# Patient Record
Sex: FEMALE | Race: BLACK OR AFRICAN AMERICAN | HISPANIC OR LATINO | URBAN - METROPOLITAN AREA
[De-identification: names, ages, dates, MRNs, and addresses within clinical notes are randomized per-mention and may not be internally consistent; named-entity substitution may affect disease eponyms.]

---

## 2024-08-24 ENCOUNTER — OFFICE VISIT (OUTPATIENT)
Dept: URGENT CARE | Facility: CLINIC | Age: 54
End: 2024-08-24
Payer: COMMERCIAL

## 2024-08-24 VITALS
RESPIRATION RATE: 19 BRPM | DIASTOLIC BLOOD PRESSURE: 79 MMHG | HEIGHT: 64 IN | BODY MASS INDEX: 28.51 KG/M2 | OXYGEN SATURATION: 96 % | WEIGHT: 167 LBS | SYSTOLIC BLOOD PRESSURE: 123 MMHG | TEMPERATURE: 98 F | HEART RATE: 78 BPM

## 2024-08-24 DIAGNOSIS — M54.31 SCIATICA OF RIGHT SIDE: Primary | ICD-10-CM

## 2024-08-24 PROCEDURE — 99214 OFFICE O/P EST MOD 30 MIN: CPT | Performed by: PHYSICIAN ASSISTANT

## 2024-08-24 RX ORDER — METHIMAZOLE 10 MG/1
10 TABLET ORAL 3 TIMES DAILY
COMMUNITY

## 2024-08-24 RX ORDER — METHOCARBAMOL 500 MG/1
500 TABLET, FILM COATED ORAL 3 TIMES DAILY
Qty: 9 TABLET | Refills: 0 | Status: SHIPPED | OUTPATIENT
Start: 2024-08-24

## 2024-08-24 RX ORDER — OMEPRAZOLE 40 MG/1
1 CAPSULE, DELAYED RELEASE ORAL DAILY
COMMUNITY
Start: 2024-05-17

## 2024-08-24 RX ORDER — SEMAGLUTIDE 1 MG/.5ML
INJECTION, SOLUTION SUBCUTANEOUS
COMMUNITY
Start: 2024-07-16

## 2024-08-24 NOTE — PROGRESS NOTES
[facility]        NAME: Patrizia Abraham is a 54 y.o. female  : 1970    MRN: [id number]  DATE: 2024  TIME: 12:32 PM    Assessment and Plan   Sciatica of right side [M54.31]  1. Sciatica of right side  methocarbamol (ROBAXIN) 500 mg tablet            Patient Instructions   Advised her to continue ibuprofen and Tylenol as well as increase her omeprazole to 40 mg twice daily to help with stomach protection.  She was advised to stop the medication as soon as she receives relief of her sciatica.  We also prescribed Robaxin which patient is to take as prescribed.  We discussed not driving or operating machinery while taking this medication.  We discussed using heating pad 20 minutes on 20 minutes off.  Should patient not have relief of her symptoms by Monday or Tuesday patient should follow-up with PCP for possible PT referral or further workup.  If tests have been performed at [facility], our office will contact you with results if changes need to be made to the care plan discussed with you at the visit.  You can review your full results on [facility]  Follow up with PCP in 3-5 days.  Proceed to  ER if symptoms worsen.    Chief Complaint     Chief Complaint   Patient presents with    Back Pain     Back pain, down right side. 3 weeks. Tried OTC meds.          History of Present Illness       HPI  This is a 54-year-old female here complaining of right sided back pain with sciatica extending down her right leg for the last 2 to 3 weeks.  Patient has tried taking ibuprofen 800 mg twice daily at most.  She notes she did have gastric bypass in his concern for gastritis.  She denies any injury or trauma to her back.  She does note that she has a history of sciatica.  She denies any numbness or tingling in the leg, loss of bowel or bladder function, fever, vomiting, diarrhea, shortness of breath or chest pain.  Review of Systems   Review of Systems   Constitutional:  Negative for fever.  "  Respiratory:  Negative for shortness of breath.    Cardiovascular:  Negative for chest pain.   Gastrointestinal:  Negative for diarrhea and vomiting.   Musculoskeletal:  Positive for back pain.   Neurological:  Negative for numbness.         Current Medications       Current Outpatient Medications:     methimazole (TAPAZOLE) 10 mg tablet, Take 10 mg by mouth 3 (three) times a day, Disp: , Rfl:     methocarbamol (ROBAXIN) 500 mg tablet, Take 1 tablet (500 mg total) by mouth 3 (three) times a day, Disp: 9 tablet, Rfl: 0    omeprazole (PriLOSEC) 40 MG capsule, Take 1 capsule by mouth daily, Disp: , Rfl:     Wegovy 1 MG/0.5ML, INJECT 1 MG EVERY WEEK BY SUBCUTANEOUS ROUTE., Disp: , Rfl:     Current Allergies     Allergies as of 08/24/2024    (No Known Allergies)            The following portions of the patient's history were reviewed and updated as appropriate: allergies, current medications, past family history, past medical history, past social history, past surgical history and problem list.     No past medical history on file.    Past Surgical History:   Procedure Laterality Date    GASTRIC BYPASS      HERNIA REPAIR         No family history on file.      Medications have been verified.        Objective   /79   Pulse 78   Temp 98 °F (36.7 °C)   Resp 19   Ht 5' 4\" (1.626 m)   Wt 75.8 kg (167 lb)   SpO2 96%   BMI 28.67 kg/m²        Physical Exam     Physical Exam  Vitals and nursing note reviewed.   Constitutional:       General: She is not in acute distress.     Appearance: Normal appearance. She is not ill-appearing, toxic-appearing or diaphoretic.   Cardiovascular:      Rate and Rhythm: Normal rate and regular rhythm.   Pulmonary:      Effort: Pulmonary effort is normal.      Breath sounds: Normal breath sounds.   Musculoskeletal:      Lumbar back: Tenderness present. No swelling, edema, signs of trauma or bony tenderness. Normal range of motion. Negative right straight leg raise test and negative " left straight leg raise test.      Comments: Patient has pain to palpation of the left lumbar paraspinal muscles.  She has full range of motion of the thoracic and lumbar spine.  Patient has pain with range of motion with right lateral bending.  Bilateral lower extremity strength 5 out of 5.  Is neurovascularly intact.   Neurological:      Mental Status: She is alert.

## 2024-09-07 ENCOUNTER — APPOINTMENT (EMERGENCY)
Dept: CT IMAGING | Facility: HOSPITAL | Age: 54
End: 2024-09-07
Payer: COMMERCIAL

## 2024-09-07 ENCOUNTER — HOSPITAL ENCOUNTER (EMERGENCY)
Facility: HOSPITAL | Age: 54
Discharge: HOME | End: 2024-09-07
Attending: EMERGENCY MEDICINE
Payer: COMMERCIAL

## 2024-09-07 VITALS
RESPIRATION RATE: 18 BRPM | DIASTOLIC BLOOD PRESSURE: 73 MMHG | SYSTOLIC BLOOD PRESSURE: 152 MMHG | OXYGEN SATURATION: 100 % | WEIGHT: 176.81 LBS | HEART RATE: 77 BPM | TEMPERATURE: 98.2 F | BODY MASS INDEX: 30.35 KG/M2

## 2024-09-07 DIAGNOSIS — M54.50 LOW BACK PAIN: Primary | ICD-10-CM

## 2024-09-07 LAB
ALBUMIN SERPL BCG-MCNC: 4 G/DL (ref 3.5–5)
ALP SERPL-CCNC: 48 U/L (ref 34–104)
ALT SERPL W P-5'-P-CCNC: 10 U/L (ref 7–52)
ANION GAP SERPL CALCULATED.3IONS-SCNC: 4 MMOL/L (ref 4–13)
AST SERPL W P-5'-P-CCNC: 13 U/L (ref 13–39)
BASOPHILS # BLD AUTO: 0.06 THOUSANDS/ÂΜL (ref 0–0.1)
BASOPHILS NFR BLD AUTO: 1 % (ref 0–1)
BILIRUB SERPL-MCNC: 0.36 MG/DL (ref 0.2–1)
BILIRUB UR QL STRIP: NEGATIVE
BUN SERPL-MCNC: 14 MG/DL (ref 5–25)
CALCIUM SERPL-MCNC: 9.1 MG/DL (ref 8.4–10.2)
CHLORIDE SERPL-SCNC: 107 MMOL/L (ref 96–108)
CLARITY UR: CLEAR
CO2 SERPL-SCNC: 31 MMOL/L (ref 21–32)
COLOR UR: COLORLESS
CREAT SERPL-MCNC: 0.65 MG/DL (ref 0.6–1.3)
EOSINOPHIL # BLD AUTO: 0.1 THOUSAND/ÂΜL (ref 0–0.61)
EOSINOPHIL NFR BLD AUTO: 2 % (ref 0–6)
ERYTHROCYTE [DISTWIDTH] IN BLOOD BY AUTOMATED COUNT: 14 % (ref 11.6–15.1)
GFR SERPL CREATININE-BSD FRML MDRD: 101 ML/MIN/1.73SQ M
GLUCOSE SERPL-MCNC: 67 MG/DL (ref 65–140)
GLUCOSE UR STRIP-MCNC: NEGATIVE MG/DL
HCT VFR BLD AUTO: 40.7 % (ref 34.8–46.1)
HGB BLD-MCNC: 12.9 G/DL (ref 11.5–15.4)
HGB UR QL STRIP.AUTO: NEGATIVE
IMM GRANULOCYTES # BLD AUTO: 0.02 THOUSAND/UL (ref 0–0.2)
IMM GRANULOCYTES NFR BLD AUTO: 0 % (ref 0–2)
KETONES UR STRIP-MCNC: NEGATIVE MG/DL
LEUKOCYTE ESTERASE UR QL STRIP: NEGATIVE
LYMPHOCYTES # BLD AUTO: 3.23 THOUSANDS/ÂΜL (ref 0.6–4.47)
LYMPHOCYTES NFR BLD AUTO: 47 % (ref 14–44)
MCH RBC QN AUTO: 28.5 PG (ref 26.8–34.3)
MCHC RBC AUTO-ENTMCNC: 31.7 G/DL (ref 31.4–37.4)
MCV RBC AUTO: 90 FL (ref 82–98)
MONOCYTES # BLD AUTO: 0.48 THOUSAND/ÂΜL (ref 0.17–1.22)
MONOCYTES NFR BLD AUTO: 7 % (ref 4–12)
NEUTROPHILS # BLD AUTO: 2.95 THOUSANDS/ÂΜL (ref 1.85–7.62)
NEUTS SEG NFR BLD AUTO: 43 % (ref 43–75)
NITRITE UR QL STRIP: NEGATIVE
NRBC BLD AUTO-RTO: 0 /100 WBCS
PH UR STRIP.AUTO: 6 [PH]
PLATELET # BLD AUTO: 255 THOUSANDS/UL (ref 149–390)
PMV BLD AUTO: 9.4 FL (ref 8.9–12.7)
POTASSIUM SERPL-SCNC: 3.9 MMOL/L (ref 3.5–5.3)
PROT SERPL-MCNC: 6.5 G/DL (ref 6.4–8.4)
PROT UR STRIP-MCNC: NEGATIVE MG/DL
RBC # BLD AUTO: 4.52 MILLION/UL (ref 3.81–5.12)
SODIUM SERPL-SCNC: 142 MMOL/L (ref 135–147)
SP GR UR STRIP.AUTO: 1.01 (ref 1–1.03)
UROBILINOGEN UR STRIP-ACNC: <2 MG/DL
WBC # BLD AUTO: 6.84 THOUSAND/UL (ref 4.31–10.16)

## 2024-09-07 PROCEDURE — 96374 THER/PROPH/DIAG INJ IV PUSH: CPT

## 2024-09-07 PROCEDURE — 74176 CT ABD & PELVIS W/O CONTRAST: CPT

## 2024-09-07 PROCEDURE — 81003 URINALYSIS AUTO W/O SCOPE: CPT | Performed by: EMERGENCY MEDICINE

## 2024-09-07 PROCEDURE — 99284 EMERGENCY DEPT VISIT MOD MDM: CPT

## 2024-09-07 PROCEDURE — 36415 COLL VENOUS BLD VENIPUNCTURE: CPT

## 2024-09-07 PROCEDURE — 80053 COMPREHEN METABOLIC PANEL: CPT | Performed by: EMERGENCY MEDICINE

## 2024-09-07 PROCEDURE — 85025 COMPLETE CBC W/AUTO DIFF WBC: CPT | Performed by: EMERGENCY MEDICINE

## 2024-09-07 PROCEDURE — 99284 EMERGENCY DEPT VISIT MOD MDM: CPT | Performed by: EMERGENCY MEDICINE

## 2024-09-07 PROCEDURE — 96361 HYDRATE IV INFUSION ADD-ON: CPT

## 2024-09-07 RX ORDER — LIDOCAINE 50 MG/G
1 PATCH TOPICAL DAILY
Qty: 30 PATCH | Refills: 0 | Status: SHIPPED | OUTPATIENT
Start: 2024-09-07

## 2024-09-07 RX ORDER — LIDOCAINE 50 MG/G
1 PATCH TOPICAL ONCE
Status: DISCONTINUED | OUTPATIENT
Start: 2024-09-07 | End: 2024-09-07 | Stop reason: HOSPADM

## 2024-09-07 RX ORDER — KETOROLAC TROMETHAMINE 30 MG/ML
30 INJECTION, SOLUTION INTRAMUSCULAR; INTRAVENOUS ONCE
Status: COMPLETED | OUTPATIENT
Start: 2024-09-07 | End: 2024-09-07

## 2024-09-07 RX ADMIN — KETOROLAC TROMETHAMINE 30 MG: 30 INJECTION, SOLUTION INTRAMUSCULAR at 18:41

## 2024-09-07 RX ADMIN — LIDOCAINE 1 PATCH: 50 PATCH CUTANEOUS at 20:52

## 2024-09-07 RX ADMIN — SODIUM CHLORIDE 1000 ML: 0.9 INJECTION, SOLUTION INTRAVENOUS at 18:41

## 2024-09-07 NOTE — ED PROVIDER NOTES
History  Chief Complaint   Patient presents with    Flank Pain     Pt c/o ongoing left sided flank pain, was seen at urgent care and given a muscle relaxer, pt states not effective. Denies urinary symptoms, denies injury     Patient is a 54-year-old female with history of gastric bypass.  She presents to the emergency room with a 1 month history of atraumatic left flank pain.  It does not radiate to the abdomen.  Does not radiate to the leg.  It is worse when sitting but better when she walks around.  No associated abdominal pain.  No urinary complaints.  No incontinence.  No nausea or vomiting.  No fever or chills.  No rash.  No history of IV drug use or cancer.        Prior to Admission Medications   Prescriptions Last Dose Informant Patient Reported? Taking?   Wegovy 1 MG/0.5ML   Yes No   Sig: INJECT 1 MG EVERY WEEK BY SUBCUTANEOUS ROUTE.   methimazole (TAPAZOLE) 10 mg tablet   Yes No   Sig: Take 10 mg by mouth 3 (three) times a day   methocarbamol (ROBAXIN) 500 mg tablet   No No   Sig: Take 1 tablet (500 mg total) by mouth 3 (three) times a day   omeprazole (PriLOSEC) 40 MG capsule   Yes No   Sig: Take 1 capsule by mouth daily      Facility-Administered Medications: None       History reviewed. No pertinent past medical history.    Past Surgical History:   Procedure Laterality Date    GASTRIC BYPASS      HERNIA REPAIR         History reviewed. No pertinent family history.  I have reviewed and agree with the history as documented.    E-Cigarette/Vaping     E-Cigarette/Vaping Substances     Social History     Tobacco Use    Smoking status: Never    Smokeless tobacco: Never   Substance Use Topics    Alcohol use: Not Currently    Drug use: Not Currently       Review of Systems   Constitutional:  Negative for chills and fever.   HENT:  Negative for rhinorrhea and sore throat.    Eyes:  Negative for pain, redness and visual disturbance.   Respiratory:  Negative for cough and shortness of breath.    Cardiovascular:   Negative for chest pain and leg swelling.   Gastrointestinal:  Negative for abdominal pain, diarrhea and vomiting.   Endocrine: Negative for polydipsia and polyuria.   Genitourinary:  Positive for flank pain. Negative for dysuria, frequency, hematuria, vaginal bleeding and vaginal discharge.   Musculoskeletal:  Positive for back pain. Negative for neck pain.   Skin:  Negative for rash and wound.   Allergic/Immunologic: Negative for immunocompromised state.   Neurological:  Negative for weakness, numbness and headaches.   Hematological:  Does not bruise/bleed easily.   Psychiatric/Behavioral:  Negative for hallucinations and suicidal ideas.        Physical Exam  Physical Exam  Vitals reviewed.   Constitutional:       General: She is not in acute distress.  HENT:      Head: Normocephalic and atraumatic.      Nose: Nose normal.      Mouth/Throat:      Mouth: Mucous membranes are moist.   Eyes:      General:         Right eye: No discharge.         Left eye: No discharge.      Conjunctiva/sclera: Conjunctivae normal.   Cardiovascular:      Rate and Rhythm: Normal rate and regular rhythm.      Pulses: Normal pulses.      Heart sounds: Normal heart sounds. No murmur heard.     No friction rub. No gallop.   Pulmonary:      Effort: Pulmonary effort is normal. No respiratory distress.      Breath sounds: Normal breath sounds. No stridor. No wheezing, rhonchi or rales.   Abdominal:      General: Bowel sounds are normal. There is no distension.      Palpations: Abdomen is soft.      Tenderness: There is no abdominal tenderness. There is no right CVA tenderness, left CVA tenderness, guarding or rebound.      Comments: No pulsatile mass.   Musculoskeletal:         General: Tenderness present. No swelling, deformity or signs of injury.      Cervical back: Normal range of motion and neck supple. No rigidity.      Right lower leg: No edema.      Left lower leg: No edema.      Comments: No calf tenderness or unilateral leg swelling.   Tenderness to the left paraspinous area.  There is some pain with movement.   Skin:     General: Skin is warm and dry.      Coloration: Skin is not jaundiced.      Findings: No rash.   Neurological:      General: No focal deficit present.      Mental Status: She is alert and oriented to person, place, and time.      Sensory: No sensory deficit.      Motor: Motor function is intact.      Comments: No saddle anesthesia.   Psychiatric:         Mood and Affect: Mood normal.         Behavior: Behavior normal.         Vital Signs  ED Triage Vitals   Temperature Pulse Respirations Blood Pressure SpO2   09/07/24 1635 09/07/24 1635 09/07/24 1635 09/07/24 1635 09/07/24 1635   98.2 °F (36.8 °C) 77 18 144/69 98 %      Temp Source Heart Rate Source Patient Position - Orthostatic VS BP Location FiO2 (%)   09/07/24 1635 09/07/24 1800 -- 09/07/24 1830 --   Oral Monitor  Left arm       Pain Score       09/07/24 1841       7           Vitals:    09/07/24 1800 09/07/24 1830 09/07/24 2015 09/07/24 2030   BP: 153/76 135/72 163/77 152/73   Pulse: 69 69 66 77         Visual Acuity      ED Medications  Medications   lidocaine (LIDODERM) 5 % patch 1 patch (1 patch Topical Medication Applied 9/7/24 2052)   sodium chloride 0.9 % bolus 1,000 mL (1,000 mL Intravenous New Bag 9/7/24 1841)   ketorolac (TORADOL) injection 30 mg (30 mg Intravenous Given 9/7/24 1841)       Diagnostic Studies  Results Reviewed       Procedure Component Value Units Date/Time    Comprehensive metabolic panel [[id number]] Collected: 09/07/24 1755    Lab Status: Final result Specimen: Blood from Arm, Right Updated: 09/07/24 1822     Sodium 142 mmol/L      Potassium 3.9 mmol/L      Chloride 107 mmol/L      CO2 31 mmol/L      ANION GAP 4 mmol/L      BUN 14 mg/dL      Creatinine 0.65 mg/dL      Glucose 67 mg/dL      Calcium 9.1 mg/dL      AST 13 U/L      ALT 10 U/L      Alkaline Phosphatase 48 U/L      Total Protein 6.5 g/dL      Albumin 4.0 g/dL      Total Bilirubin 0.36  mg/dL      eGFR 101 ml/min/1.73sq m     Narrative:      National Kidney Disease Foundation guidelines for Chronic Kidney Disease (CKD):     Stage 1 with normal or high GFR (GFR > 90 mL/min/1.73 square meters)    Stage 2 Mild CKD (GFR = 60-89 mL/min/1.73 square meters)    Stage 3A Moderate CKD (GFR = 45-59 mL/min/1.73 square meters)    Stage 3B Moderate CKD (GFR = 30-44 mL/min/1.73 square meters)    Stage 4 Severe CKD (GFR = 15-29 mL/min/1.73 square meters)    Stage 5 End Stage CKD (GFR <15 mL/min/1.73 square meters)  Note: GFR calculation is accurate only with a steady state creatinine    UA w Reflex to Microscopic w Reflex to Culture [[id number]] Collected: 09/07/24 1752    Lab Status: Final result Specimen: Urine, Clean Catch Updated: 09/07/24 1805     Color, UA Colorless     Clarity, UA Clear     Specific Gravity, UA 1.008     pH, UA 6.0     Leukocytes, UA Negative     Nitrite, UA Negative     Protein, UA Negative mg/dl      Glucose, UA Negative mg/dl      Ketones, UA Negative mg/dl      Urobilinogen, UA <2.0 mg/dl      Bilirubin, UA Negative     Occult Blood, UA Negative    CBC and differential [[id number]]  (Abnormal) Collected: 09/07/24 1755    Lab Status: Final result Specimen: Blood from Arm, Right Updated: 09/07/24 1759     WBC 6.84 Thousand/uL      RBC 4.52 Million/uL      Hemoglobin 12.9 g/dL      Hematocrit 40.7 %      MCV 90 fL      MCH 28.5 pg      MCHC 31.7 g/dL      RDW 14.0 %      MPV 9.4 fL      Platelets 255 Thousands/uL      nRBC 0 /100 WBCs      Segmented % 43 %      Immature Grans % 0 %      Lymphocytes % 47 %      Monocytes % 7 %      Eosinophils Relative 2 %      Basophils Relative 1 %      Absolute Neutrophils 2.95 Thousands/µL      Absolute Immature Grans 0.02 Thousand/uL      Absolute Lymphocytes 3.23 Thousands/µL      Absolute Monocytes 0.48 Thousand/µL      Eosinophils Absolute 0.10 Thousand/µL      Basophils Absolute 0.06 Thousands/µL                    CT renal stone study abdomen pelvis  without contrast   Final Result by Elder Chavez MD (09/07 1955)      No acute abnormality identified in the lower chest, abdomen or pelvis.      4 mm nonobstructing left lower pole renal calculi.   Otherwise bilateral kidneys/ureters unremarkable.   Noting that the distal ureters are difficult to visualize through the pelvis, however no radiopaque calculi identified in the region.               Workstation performed: [id number]         CT recon only lumbar spine   Final Result by Elder Chavez MD (09/07 2001)      No fracture or traumatic subluxation.            Workstation performed: [id number]                    Procedures  Procedures         ED Course                                               Medical Decision Making  Imaging was negative for kidney stone.  No AAA.  There is no UTI.  No pyelonephritis.  Pain appears musculoskeletal.  No signs or symptoms of cauda equina syndrome or cord compression.  Neurologic exam is normal.  No red flag signs or symptoms.  Pain management is difficult as this pain is somewhat chronic and patient is unable to take NSAIDs due to gastric bypass.  Will prescribe lidocaine patch and recommend Tylenol.  Will refer to comp spine for definitive evaluation and treatment.    Amount and/or Complexity of Data Reviewed  Labs: ordered. Decision-making details documented in ED Course.  Radiology: ordered. Decision-making details documented in ED Course.    Risk  Prescription drug management.  Decision regarding hospitalization.                 Disposition  Final diagnoses:   Low back pain     Time reflects when diagnosis was documented in both MDM as applicable and the Disposition within this note       Time User Action Codes Description Comment    9/7/2024  8:51 PM Mane Penaloza Add [M54.50] Low back pain           ED Disposition       ED Disposition   Discharge    Condition   Stable    Date/Time   Sat Sep 7, 2024  8:51 PM    Comment   Patrizia Abraham discharge to  home/self care.                   Follow-up Information       Follow up With Specialties Details Why Contact Info Additional Information    Antwan Lagos MD Family Medicine In 1 week  [address]  [address]  [place] [address]  [phone]       [facility] Physical Therapy   [phone]    [facility] In 1 week  [address] [address] [phone] [facility], [address], [address] [phone]            Patient's Medications   Discharge Prescriptions    LIDOCAINE (LIDODERM) 5 %    Apply 1 patch topically over 12 hours daily Remove & Discard patch within 12 hours or as directed by MD       Start Date: 9/7/2024  End Date: --       Order Dose: 1 patch       Quantity: 30 patch    Refills: 0           PDMP Review       None            ED Provider  Electronically Signed by             Mane Penaloza MD  09/07/24 2052       Mane Penaloza MD  09/07/24 2054

## 2024-09-09 ENCOUNTER — NURSE TRIAGE (OUTPATIENT)
Dept: PHYSICAL THERAPY | Facility: OTHER | Age: 54
End: 2024-09-09

## 2024-09-09 DIAGNOSIS — M54.41 ACUTE LEFT-SIDED LOW BACK PAIN WITH RIGHT-SIDED SCIATICA: Primary | ICD-10-CM

## 2024-09-09 NOTE — TELEPHONE ENCOUNTER
Additional Information   Negative: Is this related to a work injury?   Negative: Is this related to an MVA?   Negative: Are you currently recieving homecare services?   Negative: Has the patient had unexplained weight loss?   Negative: Does the patient have a fever?   Negative: Is the patient experiencing blood in sputum?   Negative: Has the patient experienced major trauma? (fall from height, high speed collision, direct blow to spine) and is also experiencing nausea, light-headedness, or loss of consciousness?   Negative: Is the patient experiencing urine retention?   Negative: Is the patient experiencing acute drop foot or paralysis?   Negative: Is this a chronic condition?    Background - Initial Assessment  Clinical complaint: left low back pain which she states radiates to the right leg. States some numbness and tingling of the right leg. States no NKI or history of back issuers.  Date of onset: 1 month  Frequency of pain: intermittent  Quality of pain: sharp and stabbing    Protocols used: [facility] Protocol    Comprehensive [facility] Program was reviewed in detail and what we can provide for their back pain.  Patient is agreeable to being triaged and would like to proceed with Physical Therapy.    Referral was placed for Physical Therapy at the [place] site. Patients information was sent to the  to make evaluation appointment. Patient made aware that the PT office  will be calling to schedule the appointment.  Patient was provided with the phone number to the PT office.    No further questions and/or concerns were voiced by the patient at this time. Patient states understanding of the referral that was placed.    Referral Closed.

## 2024-09-18 ENCOUNTER — OFFICE VISIT (OUTPATIENT)
Dept: URGENT CARE | Facility: CLINIC | Age: 54
End: 2024-09-18
Payer: COMMERCIAL

## 2024-09-18 VITALS
WEIGHT: 178 LBS | TEMPERATURE: 97.3 F | DIASTOLIC BLOOD PRESSURE: 72 MMHG | BODY MASS INDEX: 30.55 KG/M2 | OXYGEN SATURATION: 97 % | SYSTOLIC BLOOD PRESSURE: 124 MMHG | RESPIRATION RATE: 18 BRPM | HEART RATE: 72 BPM

## 2024-09-18 DIAGNOSIS — U07.1 COVID-19: Primary | ICD-10-CM

## 2024-09-18 PROBLEM — G47.33 OSA (OBSTRUCTIVE SLEEP APNEA): Status: ACTIVE | Noted: 2017-03-14

## 2024-09-18 LAB
SARS-COV-2 AG UPPER RESP QL IA: POSITIVE
VALID CONTROL: ABNORMAL

## 2024-09-18 PROCEDURE — 99213 OFFICE O/P EST LOW 20 MIN: CPT | Performed by: NURSE PRACTITIONER

## 2024-09-18 PROCEDURE — 87811 SARS-COV-2 COVID19 W/OPTIC: CPT | Performed by: NURSE PRACTITIONER

## 2024-09-18 RX ORDER — UBIDECARENONE 30 MG
1 CAPSULE ORAL DAILY
COMMUNITY

## 2024-09-18 RX ORDER — DEXTROMETHORPHAN HYDROBROMIDE AND PROMETHAZINE HYDROCHLORIDE 15; 6.25 MG/5ML; MG/5ML
5 SYRUP ORAL 4 TIMES DAILY PRN
Qty: 180 ML | Refills: 0 | Status: SHIPPED | OUTPATIENT
Start: 2024-09-18

## 2024-09-18 RX ORDER — BENZONATATE 200 MG/1
200 CAPSULE ORAL 3 TIMES DAILY PRN
Qty: 20 CAPSULE | Refills: 0 | Status: SHIPPED | OUTPATIENT
Start: 2024-09-18

## 2024-09-18 NOTE — LETTER
[facility] [place]  [address]  [facility] [address]  Dept: [phone]    September 18, 2024    Patient: Patrizia Abraham  YOB: 1970    Patrizia Abraham was seen and evaluated at our [facility]. Please note if Covid and Flu tests are negative, they may return to work when fever free for 24 hours without the use of a fever reducing agent. If Covid or Flu test is positive, they may return to work on 09/23/2024, as this is 5 days from the onset of symptoms. She may work remotely during this time.     Sincerely,    GABINO Rodas

## 2024-09-18 NOTE — LETTER
September 18, 2024     Patient: Patrizia Abraham   YOB: 1970   Date of Visit: 9/18/2024       To Whom it May Concern:    Patrizia Abraham was seen in my clinic on 9/18/2024. She may return to work on 19/23/2024  She may work remotely during this time.    If you have any questions or concerns, please don't hesitate to call.         Sincerely,          GABINO Rodas        CC: No Recipients

## 2024-09-18 NOTE — PROGRESS NOTES
[facility]        NAME: Patrizia Abraham is a 54 y.o. female  : 1970    MRN: [id number]  DATE: 2024  TIME: 12:53 PM    Assessment and Plan   COVID-19 [U07.1]  1. COVID-19  Poct Covid 19 Rapid Antigen Test    promethazine-dextromethorphan (PHENERGAN-DM) 6.25-15 mg/5 mL oral syrup    benzonatate (TESSALON) 200 MG capsule        Covid test at home positive today and positive in office. Advised can use tessalon or promethazine as directed for cough. Rest and hydrate.   Vitamin D3 2000 IU daily  Vitamin C 1000mg twice per day  Multivitamin daily  Some studies suggest that Zinc 12.5-15mg every 2 hours while awake x 5 days may shorten the duration cold symptoms by 1-2 days.   Fluids and rest  Nasal saline spray; Afrin if severe congestion (do not use for more than 3 days)  Over the counter decongestant  Tylenol/Ibuprofen for pain/fever  Salt water gargles and chloraseptic spray  Throat Coat Tea  Warm compresses over sinuses  Steam treatment (utilize proper safety precautions when in contact with hot water/steam)       Patient Instructions       Follow up with PCP in 3-5 days.  Proceed to  ER if symptoms worsen.    If tests are performed, our office will contact you with results only if changes need to made to the care plan discussed with you at the visit. You can review your full results on [facility].    Chief Complaint     Chief Complaint   Patient presents with    COVID-19     Pt tested positive for covid today and symptoms started 4 days ago         History of Present Illness       Monday covid test negative, today was positive.     Cough  This is a new problem. The current episode started in the past 7 days (3 days ago). The problem has been unchanged. The problem occurs constantly. The cough is Non-productive. Associated symptoms include headaches, nasal congestion and postnasal drip. Pertinent negatives include no chest pain, chills, ear congestion, ear pain, fever,  heartburn, hemoptysis, myalgias, rash, rhinorrhea, sore throat, shortness of breath, sweats, weight loss or wheezing.       Review of Systems   Review of Systems   Constitutional:  Positive for fatigue. Negative for chills, fever and weight loss.   HENT:  Positive for postnasal drip. Negative for ear pain, rhinorrhea and sore throat.    Respiratory:  Positive for cough. Negative for hemoptysis, shortness of breath and wheezing.    Cardiovascular:  Negative for chest pain.   Gastrointestinal:  Negative for heartburn.   Musculoskeletal:  Negative for myalgias.   Skin:  Negative for rash.   Neurological:  Positive for headaches.         Current Medications       Current Outpatient Medications:     benzonatate (TESSALON) 200 MG capsule, Take 1 capsule (200 mg total) by mouth 3 (three) times a day as needed for cough, Disp: 20 capsule, Rfl: 0    lidocaine (Lidoderm) 5 %, Apply 1 patch topically over 12 hours daily Remove & Discard patch within 12 hours or as directed by MD, Disp: 30 patch, Rfl: 0    methimazole (TAPAZOLE) 10 mg tablet, Take 10 mg by mouth 3 (three) times a day, Disp: , Rfl:     methocarbamol (ROBAXIN) 500 mg tablet, Take 1 tablet (500 mg total) by mouth 3 (three) times a day, Disp: 9 tablet, Rfl: 0    omeprazole (PriLOSEC) 40 MG capsule, Take 1 capsule by mouth daily, Disp: , Rfl:     promethazine-dextromethorphan (PHENERGAN-DM) 6.25-15 mg/5 mL oral syrup, Take 5 mL by mouth 4 (four) times a day as needed for cough, Disp: 180 mL, Rfl: 0    Wegovy 1 MG/0.5ML, INJECT 1 MG EVERY WEEK BY SUBCUTANEOUS ROUTE., Disp: , Rfl:     Multiple Vitamins-Minerals (Multi For Her) TABS, Take 1 tablet by mouth daily, Disp: , Rfl:     Current Allergies     Allergies as of 09/18/2024    (No Known Allergies)            The following portions of the patient's history were reviewed and updated as appropriate: allergies, current medications, past family history, past medical history, past social history, past surgical history  and problem list.     History reviewed. No pertinent past medical history.    Past Surgical History:   Procedure Laterality Date    GASTRIC BYPASS      HERNIA REPAIR         History reviewed. No pertinent family history.      Medications have been verified.        Objective   /72   Pulse 72   Temp (!) 97.3 °F (36.3 °C) (Tympanic)   Resp 18   Wt 80.7 kg (178 lb)   SpO2 97%   BMI 30.55 kg/m²        Physical Exam     Physical Exam  Vitals and nursing note reviewed.   Constitutional:       General: She is not in acute distress.     Appearance: Normal appearance. She is not ill-appearing.   HENT:      Head: Normocephalic and atraumatic.      Right Ear: Tympanic membrane, ear canal and external ear normal.      Left Ear: Tympanic membrane, ear canal and external ear normal.      Nose: Congestion and rhinorrhea present.      Mouth/Throat:      Mouth: Mucous membranes are moist.      Pharynx: Posterior oropharyngeal erythema present. No oropharyngeal exudate.   Eyes:      Pupils: Pupils are equal, round, and reactive to light.   Cardiovascular:      Rate and Rhythm: Normal rate and regular rhythm.      Pulses: Normal pulses.      Heart sounds: Normal heart sounds.   Pulmonary:      Effort: Pulmonary effort is normal.      Breath sounds: Normal breath sounds.   Musculoskeletal:      Cervical back: Normal range of motion and neck supple.   Skin:     General: Skin is warm and dry.   Neurological:      Mental Status: She is alert.

## 2025-01-01 ENCOUNTER — HOSPITAL ENCOUNTER (EMERGENCY)
Facility: HOSPITAL | Age: 55
Discharge: HOME | End: 2025-01-02
Payer: COMMERCIAL

## 2025-01-01 VITALS
HEIGHT: 64 IN | DIASTOLIC BLOOD PRESSURE: 86 MMHG | RESPIRATION RATE: 19 BRPM | SYSTOLIC BLOOD PRESSURE: 143 MMHG | OXYGEN SATURATION: 99 % | TEMPERATURE: 97.6 F | BODY MASS INDEX: 29.37 KG/M2 | WEIGHT: 172 LBS | HEART RATE: 69 BPM

## 2025-01-01 DIAGNOSIS — R21 RASH: ICD-10-CM

## 2025-01-01 DIAGNOSIS — T78.40XA ALLERGIC REACTION, INITIAL ENCOUNTER: Primary | ICD-10-CM

## 2025-01-01 LAB
ALBUMIN SERPL BCG-MCNC: 4.1 G/DL (ref 3.5–5)
ALP SERPL-CCNC: 55 U/L (ref 34–104)
ALT SERPL W P-5'-P-CCNC: 22 U/L (ref 7–52)
ANION GAP SERPL CALCULATED.3IONS-SCNC: 7 MMOL/L (ref 4–13)
AST SERPL W P-5'-P-CCNC: 54 U/L (ref 13–39)
BASOPHILS # BLD AUTO: 0.01 THOUSANDS/ΜL (ref 0–0.1)
BASOPHILS NFR BLD AUTO: 0 % (ref 0–1)
BILIRUB SERPL-MCNC: 0.36 MG/DL (ref 0.2–1)
BUN SERPL-MCNC: 17 MG/DL (ref 5–25)
CALCIUM SERPL-MCNC: 9 MG/DL (ref 8.4–10.2)
CHLORIDE SERPL-SCNC: 106 MMOL/L (ref 96–108)
CO2 SERPL-SCNC: 27 MMOL/L (ref 21–32)
CREAT SERPL-MCNC: 0.69 MG/DL (ref 0.6–1.3)
EOSINOPHIL # BLD AUTO: 0.02 THOUSAND/ΜL (ref 0–0.61)
EOSINOPHIL NFR BLD AUTO: 0 % (ref 0–6)
ERYTHROCYTE [DISTWIDTH] IN BLOOD BY AUTOMATED COUNT: 13.9 % (ref 11.6–15.1)
GFR SERPL CREATININE-BSD FRML MDRD: 99 ML/MIN/1.73SQ M
GLUCOSE SERPL-MCNC: 100 MG/DL (ref 65–140)
HCT VFR BLD AUTO: 43.5 % (ref 34.8–46.1)
HGB BLD-MCNC: 13.9 G/DL (ref 11.5–15.4)
IMM GRANULOCYTES # BLD AUTO: 0.01 THOUSAND/UL (ref 0–0.2)
IMM GRANULOCYTES NFR BLD AUTO: 0 % (ref 0–2)
LYMPHOCYTES # BLD AUTO: 3.58 THOUSANDS/ΜL (ref 0.6–4.47)
LYMPHOCYTES NFR BLD AUTO: 49 % (ref 14–44)
MCH RBC QN AUTO: 28.1 PG (ref 26.8–34.3)
MCHC RBC AUTO-ENTMCNC: 32 G/DL (ref 31.4–37.4)
MCV RBC AUTO: 88 FL (ref 82–98)
MONOCYTES # BLD AUTO: 0.5 THOUSAND/ΜL (ref 0.17–1.22)
MONOCYTES NFR BLD AUTO: 7 % (ref 4–12)
NEUTROPHILS # BLD AUTO: 3.17 THOUSANDS/ΜL (ref 1.85–7.62)
NEUTS SEG NFR BLD AUTO: 44 % (ref 43–75)
NRBC BLD AUTO-RTO: 0 /100 WBCS
PLATELET # BLD AUTO: 278 THOUSANDS/UL (ref 149–390)
PMV BLD AUTO: 9.5 FL (ref 8.9–12.7)
POTASSIUM SERPL-SCNC: 4 MMOL/L (ref 3.5–5.3)
PROT SERPL-MCNC: 6.6 G/DL (ref 6.4–8.4)
RBC # BLD AUTO: 4.95 MILLION/UL (ref 3.81–5.12)
SODIUM SERPL-SCNC: 140 MMOL/L (ref 135–147)
TSH SERPL DL<=0.05 MIU/L-ACNC: 5.16 UIU/ML (ref 0.45–4.5)
WBC # BLD AUTO: 7.29 THOUSAND/UL (ref 4.31–10.16)

## 2025-01-01 PROCEDURE — 84443 ASSAY THYROID STIM HORMONE: CPT

## 2025-01-01 PROCEDURE — 80053 COMPREHEN METABOLIC PANEL: CPT

## 2025-01-01 PROCEDURE — 84439 ASSAY OF FREE THYROXINE: CPT

## 2025-01-01 PROCEDURE — 36415 COLL VENOUS BLD VENIPUNCTURE: CPT

## 2025-01-01 PROCEDURE — 96375 TX/PRO/DX INJ NEW DRUG ADDON: CPT

## 2025-01-01 PROCEDURE — 85025 COMPLETE CBC W/AUTO DIFF WBC: CPT

## 2025-01-01 PROCEDURE — 96361 HYDRATE IV INFUSION ADD-ON: CPT

## 2025-01-01 PROCEDURE — 99284 EMERGENCY DEPT VISIT MOD MDM: CPT

## 2025-01-01 PROCEDURE — 96365 THER/PROPH/DIAG IV INF INIT: CPT

## 2025-01-01 PROCEDURE — 96376 TX/PRO/DX INJ SAME DRUG ADON: CPT

## 2025-01-01 PROCEDURE — 99283 EMERGENCY DEPT VISIT LOW MDM: CPT

## 2025-01-01 RX ORDER — DIPHENHYDRAMINE HYDROCHLORIDE 50 MG/ML
25 INJECTION INTRAMUSCULAR; INTRAVENOUS ONCE
Status: COMPLETED | OUTPATIENT
Start: 2025-01-01 | End: 2025-01-01

## 2025-01-01 RX ORDER — ONDANSETRON 2 MG/ML
4 INJECTION INTRAMUSCULAR; INTRAVENOUS ONCE
Status: COMPLETED | OUTPATIENT
Start: 2025-01-01 | End: 2025-01-01

## 2025-01-01 RX ORDER — METHYLPREDNISOLONE SODIUM SUCCINATE 125 MG/2ML
125 INJECTION, POWDER, LYOPHILIZED, FOR SOLUTION INTRAMUSCULAR; INTRAVENOUS ONCE
Status: COMPLETED | OUTPATIENT
Start: 2025-01-01 | End: 2025-01-01

## 2025-01-01 RX ORDER — FAMOTIDINE 10 MG/ML
20 INJECTION, SOLUTION INTRAVENOUS ONCE
Status: COMPLETED | OUTPATIENT
Start: 2025-01-01 | End: 2025-01-01

## 2025-01-01 RX ORDER — ACETAMINOPHEN 10 MG/ML
1000 INJECTION, SOLUTION INTRAVENOUS ONCE
Status: COMPLETED | OUTPATIENT
Start: 2025-01-01 | End: 2025-01-02

## 2025-01-01 RX ADMIN — ONDANSETRON 4 MG: 2 INJECTION INTRAMUSCULAR; INTRAVENOUS at 21:56

## 2025-01-01 RX ADMIN — FAMOTIDINE 20 MG: 10 INJECTION, SOLUTION INTRAVENOUS at 21:57

## 2025-01-01 RX ADMIN — METHYLPREDNISOLONE SODIUM SUCCINATE 125 MG: 125 INJECTION, POWDER, FOR SOLUTION INTRAMUSCULAR; INTRAVENOUS at 21:56

## 2025-01-01 RX ADMIN — DIPHENHYDRAMINE HYDROCHLORIDE 25 MG: 50 INJECTION, SOLUTION INTRAMUSCULAR; INTRAVENOUS at 21:56

## 2025-01-01 RX ADMIN — ACETAMINOPHEN 1000 MG: 10 INJECTION, SOLUTION INTRAVENOUS at 23:33

## 2025-01-01 RX ADMIN — SODIUM CHLORIDE 1000 ML: 0.9 INJECTION, SOLUTION INTRAVENOUS at 21:56

## 2025-01-01 RX ADMIN — DIPHENHYDRAMINE HYDROCHLORIDE 25 MG: 50 INJECTION, SOLUTION INTRAMUSCULAR; INTRAVENOUS at 23:27

## 2025-01-02 LAB — T4 FREE SERPL-MCNC: 0.69 NG/DL (ref 0.61–1.12)

## 2025-01-02 RX ORDER — PREDNISONE 20 MG/1
40 TABLET ORAL DAILY
Qty: 10 TABLET | Refills: 0 | Status: SHIPPED | OUTPATIENT
Start: 2025-01-02 | End: 2025-01-07

## 2025-01-02 NOTE — ED PROVIDER NOTES
ED Disposition       None          Assessment & Plan       Medical Decision Making  Given history and exam, presentation most consistent with allergic rash.  Patient presents with symptoms consistent with an allergic reaction.  Patient without wheezing, shortness of breath, throat itching or tightness, hoarseness, stridor, hypoxia, abdominal pain, diarrhea, hypotension, chest pain, palpitations, altered mental status, syncope.    Given presentation and symptoms of urticaria and hives will treat with prednisone, pepcid, and benadryl.    Patient symptoms resolved with pepcid, benadryl, steroids.     You can take Benadryl and Pepcid, which are available over the counter, to help control your symptoms at home.  Prescription for steroids, please take them as directed.  Close follow up with PCP, avoid any exposures that you had prior to the reaction including foods, medicine, lotions, detergents, or soaps until you speak with your PCP. Discuss with them about seeing an Allergist.   Return to the Emergency Department if you experience rashes, difficulty breathing or swallowing, lip/mouth/tongue swelling, vomiting, or for any other concerning symptoms.  Prior to discharge, discharge instructions were discussed with patient at bedside. Patient was provided both verbal and written instructions. Patient is understanding of the discharge instructions and is agreeable to plan of care. Return precautions were discussed with patient bedside, patient verbalized understanding of signs and symptoms that would necessitate return to the ED. All questions were answered. Patient was comfortable with the plan of care and discharged to home.        Problems Addressed:  Allergic reaction, initial encounter: acute illness or injury  Rash: acute illness or injury    Amount and/or Complexity of Data Reviewed  Labs: ordered.    Risk  Prescription drug management.        ED Course as of 01/02/25 0838   u Jan 02, 2025   0008 Rash chest pain  "significantly improved with erythema nearly completely gone.       Medications - No data to display    ED Risk Strat Scores                          SBIRT 22yo+      Flowsheet Row Most Recent Value   Initial Alcohol Screen: US AUDIT-C     1. How often do you have a drink containing alcohol? 0 Filed at: 01/01/2025 2105   2. How many drinks containing alcohol do you have on a typical day you are drinking?  0 Filed at: 01/01/2025 2105   3b. FEMALE Any Age, or MALE 65+: How often do you have 4 or more drinks on one occassion? 0 Filed at: 01/01/2025 2105   Audit-C Score 0 Filed at: 01/01/2025 2105   RAÚL: How many times in the past year have you...    Used an illegal drug or used a prescription medication for non-medical reasons? Never Filed at: 01/01/2025 2105                            History of Present Illness       Chief Complaint   Patient presents with    Allergic Reaction     Pt reports eating fish tonight and how has a rash and is \"red all over\". Denies fish allergies before this. Denies SOB       History reviewed. No pertinent past medical history.   Past Surgical History:   Procedure Laterality Date    GASTRIC BYPASS      HERNIA REPAIR        History reviewed. No pertinent family history.   Social History     Tobacco Use    Smoking status: Never    Smokeless tobacco: Never   Vaping Use    Vaping status: Never Used   Substance Use Topics    Alcohol use: Not Currently    Drug use: Not Currently      E-Cigarette/Vaping    E-Cigarette Use Never User       E-Cigarette/Vaping Substances    Nicotine No     THC No     CBD No     Flavoring No     Other No     Unknown No       I have reviewed and agree with the history as documented.     The patient is a 54 y.o. female with a history of bypass and hernia repair who presents to [facility] with a chief complaint of rash. Symptoms began about 45 minutes after eating fish tonight and have been constant since onset. She denies any pain anywhere. Associated " symptoms include itchiness and diffuse rash. Symptoms are aggravated with none noted and alleviating factors include none noted. The patient denies fever, chills, night sweats, chest pain, shortness of breath, cough, sputum, stridor, drooling, wheezing, difficulty swallowing, syncope, lightheadedness, dizziness, numbness, tingling, angioedema, lip swelling, face swelling. No other reported symptoms at this time.  Patient denies allergies to anything that she knows of            History provided by:  Patient   used: No    Allergic Reaction  Presenting symptoms: rash        Review of Systems   Constitutional:  Negative for chills and fever.   HENT:  Negative for ear pain and sore throat.    Eyes:  Negative for pain and visual disturbance.   Respiratory:  Negative for cough and shortness of breath.    Cardiovascular:  Negative for chest pain and palpitations.   Gastrointestinal:  Negative for abdominal pain and vomiting.   Genitourinary:  Negative for dysuria and hematuria.   Musculoskeletal:  Negative for arthralgias and back pain.   Skin:  Positive for rash. Negative for color change.   Neurological:  Negative for seizures and syncope.   All other systems reviewed and are negative.          Objective       ED Triage Vitals [01/01/25 2103]   Temperature Pulse Blood Pressure Respirations SpO2 Patient Position - Orthostatic VS   97.6 °F (36.4 °C) 76 147/76 18 100 % Sitting      Temp Source Heart Rate Source BP Location FiO2 (%) Pain Score    Temporal Monitor Left arm -- --      Vitals      Date and Time Temp Pulse SpO2 Resp BP Pain Score FACES Pain Rating User   01/01/25 2103 97.6 °F (36.4 °C) 76 100 % 18 147/76 -- -- RO            Physical Exam  Vitals reviewed.   Constitutional:       General: She is not in acute distress.     Appearance: Normal appearance. She is not toxic-appearing.   HENT:      Head: Normocephalic.      Right Ear: Tympanic membrane, ear canal and external ear normal.      Left  Ear: Tympanic membrane, ear canal and external ear normal.      Nose: Nose normal. No congestion or rhinorrhea.      Mouth/Throat:      Mouth: Mucous membranes are moist.      Pharynx: Oropharynx is clear. No oropharyngeal exudate.   Eyes:      General: No scleral icterus.     Conjunctiva/sclera: Conjunctivae normal.   Cardiovascular:      Rate and Rhythm: Normal rate.      Pulses: Normal pulses.   Pulmonary:      Effort: Pulmonary effort is normal. No respiratory distress.      Breath sounds: Normal breath sounds. No wheezing or rales.   Abdominal:      General: Abdomen is flat. Bowel sounds are normal.      Palpations: Abdomen is soft.      Tenderness: There is no abdominal tenderness.   Musculoskeletal:         General: No swelling, deformity or signs of injury.      Cervical back: Normal range of motion and neck supple.      Right lower leg: No edema.      Left lower leg: No edema.   Skin:     General: Skin is warm.      Capillary Refill: Capillary refill takes less than 2 seconds.      Coloration: Skin is not jaundiced.      Findings: Rash present. No bruising or lesion.   Neurological:      Mental Status: She is alert and oriented to person, place, and time. Mental status is at baseline.         Results Reviewed       None            No orders to display       Procedures    ED Medication and Procedure Management   Prior to Admission Medications   Prescriptions Last Dose Informant Patient Reported? Taking?   Multiple Vitamins-Minerals (Multi For Her) TABS   Yes No   Sig: Take 1 tablet by mouth daily   Wegovy 1 MG/0.5ML   Yes No   Sig: INJECT 1 MG EVERY WEEK BY SUBCUTANEOUS ROUTE.   benzonatate (TESSALON) 200 MG capsule   No No   Sig: Take 1 capsule (200 mg total) by mouth 3 (three) times a day as needed for cough   lidocaine (Lidoderm) 5 %   No No   Sig: Apply 1 patch topically over 12 hours daily Remove & Discard patch within 12 hours or as directed by MD   methimazole (TAPAZOLE) 10 mg tablet   Yes No   Sig:  Take 10 mg by mouth 3 (three) times a day   methocarbamol (ROBAXIN) 500 mg tablet   No No   Sig: Take 1 tablet (500 mg total) by mouth 3 (three) times a day   omeprazole (PriLOSEC) 40 MG capsule   Yes No   Sig: Take 1 capsule by mouth daily   promethazine-dextromethorphan (PHENERGAN-DM) 6.25-15 mg/5 mL oral syrup   No No   Sig: Take 5 mL by mouth 4 (four) times a day as needed for cough      Facility-Administered Medications: None     Patient's Medications   Discharge Prescriptions    No medications on file     No discharge procedures on file.  ED SEPSIS DOCUMENTATION            Neeraj Thapa PA-C  01/02/25 0825

## 2025-01-02 NOTE — DISCHARGE INSTRUCTIONS
Follow-up with PCP and allergist.  Take medication as prescribed.  Take Benadryl every 6 hours as needed.  Avoid any food that you had tonight including the fish.  If you develop any shortness of breath, drooling, difficulty swallowing, other worsening symptoms return to the ER immediately.

## 2025-02-05 ENCOUNTER — OFFICE VISIT (OUTPATIENT)
Dept: ENDOCRINOLOGY | Facility: CLINIC | Age: 55
End: 2025-02-05
Payer: COMMERCIAL

## 2025-02-05 ENCOUNTER — APPOINTMENT (OUTPATIENT)
Dept: LAB | Facility: CLINIC | Age: 55
End: 2025-02-05
Payer: COMMERCIAL

## 2025-02-05 VITALS
RESPIRATION RATE: 18 BRPM | SYSTOLIC BLOOD PRESSURE: 118 MMHG | HEIGHT: 64 IN | TEMPERATURE: 98.2 F | OXYGEN SATURATION: 96 % | BODY MASS INDEX: 30.42 KG/M2 | HEART RATE: 60 BPM | WEIGHT: 178.2 LBS | DIASTOLIC BLOOD PRESSURE: 68 MMHG

## 2025-02-05 DIAGNOSIS — E05.90 HYPERTHYROIDISM: Primary | ICD-10-CM

## 2025-02-05 DIAGNOSIS — E05.90 HYPERTHYROIDISM: ICD-10-CM

## 2025-02-05 PROCEDURE — 84480 ASSAY TRIIODOTHYRONINE (T3): CPT

## 2025-02-05 PROCEDURE — 84443 ASSAY THYROID STIM HORMONE: CPT

## 2025-02-05 PROCEDURE — 36415 COLL VENOUS BLD VENIPUNCTURE: CPT

## 2025-02-05 PROCEDURE — 99204 OFFICE O/P NEW MOD 45 MIN: CPT | Performed by: STUDENT IN AN ORGANIZED HEALTH CARE EDUCATION/TRAINING PROGRAM

## 2025-02-05 RX ORDER — LORAZEPAM 0.5 MG/1
1 TABLET ORAL DAILY PRN
COMMUNITY
Start: 2025-01-18

## 2025-02-05 NOTE — PROGRESS NOTES
"Name: Patrizia Abraham      : 1970      MRN: [id number]  Encounter Provider: Holli Rincon MD  Encounter Date: 2025   Encounter department: [facility] [place]  :  Assessment & Plan  Hyperthyroidism  Long history of hyperthyroidism due to Graves' disease currently on antithyroid medication with methimazole 10 mg daily and recent TFT which showed iatrogenic hyperthyroidism with elevated TSH, therefore methimazole should be adjusted, although we agreed to repeat TSH, total T3 along with TRAb antibody to get an updated result to adjust the dose accordingly.  If TRAb antibody is negative, the chance that Graves' is in remission is likely.  In this case we will try to taper her off antithyroid medication.  We did reviewed definitive therapy for hyperthyroidism including iodine ablation for total thyroidectomy in case that hyperthyroidism is not in remission given the fact that she has been on methimazole for a long period of time however she is not willing to consider them.  Methimazole adverse reactions reviewed.  Thyroid-pituitary axis discussed.  Physical exam is negative for thyroid nodule, I deferred by ultrasound.    Orders:    TSH, 3rd generation; Future    T3; Future    TRAb (TSH Receptor Binding Antibody); Future        History of Present Illness   HPI  Patrizia Abraham is a 54 y.o. female who presents to establish care with us for hyperthyroidism.    She reports more than 20 years history of hyperthyroidism due to Graves' disease, and curently on Methimazole 10 mg daily,     [facility], \"10-year history of Graves' disease diagnosed and treated in [facility] after multiple ED presentation for palpitations and unintentional weight loss.    [facility].        She reports hives for which was seen in ED, TFT was checked and showed:    Component      Latest Ref Rng 2025   TSH 3RD " GENERATON      0.450 - 4.500 uIU/mL 5.161 (H)    FREE T4      0.61 - 1.12 ng/dL 0.69         She is very ancious and nervous. PCP prescribed atvian,     Not taking biotin.    Currently  on Wegovy 1 mg weekly.  Hx of gastric sleeve 8 years ago.  Change in appetite                            No  Change in Energy levels                    Yes   Heat intolerance/ cold intolerance      Yes   Shakes/ tremors/ palpitations             Yes   Diarrhea/constipation                          Yes   Difficulty in sleeping                            No  Anxiety                                                Yes   Hair loss                                              Yes   Dry eyes/FB sensation                        No  Eye pain                                              no  Tearing/redness/swellling                   No     Changes in menstrual cycles:hysterectomy 9 years ago due to fibroid      Swelling in the neck: No  Obstructive symptoms: no trouble breathing, swallowing or hoarseness in voice     Family history of thyroid disease: sister with hyperthyroidism.        History obtained from: patient    Review of Systems :  negative except as mentioned in HPI.    Medical History Reviewed by provider this encounter:     .  Current Outpatient Medications on File Prior to Visit   Medication Sig Dispense Refill    LORazepam (ATIVAN) 0.5 mg tablet Take 1 tablet by mouth daily as needed      methimazole (TAPAZOLE) 10 mg tablet Take 10 mg by mouth 3 (three) times a day      omeprazole (PriLOSEC) 40 MG capsule Take 1 capsule by mouth daily      Wegovy 1 MG/0.5ML INJECT 1 MG EVERY WEEK BY SUBCUTANEOUS ROUTE.      benzonatate (TESSALON) 200 MG capsule Take 1 capsule (200 mg total) by mouth 3 (three) times a day as needed for cough 20 capsule 0    lidocaine (Lidoderm) 5 % Apply 1 patch topically over 12 hours daily Remove & Discard patch within 12 hours or as directed by MD 30 patch 0    methocarbamol (ROBAXIN) 500 mg tablet Take 1  "tablet (500 mg total) by mouth 3 (three) times a day 9 tablet 0    Multiple Vitamins-Minerals (Multi For Her) TABS Take 1 tablet by mouth daily      promethazine-dextromethorphan (PHENERGAN-DM) 6.25-15 mg/5 mL oral syrup Take 5 mL by mouth 4 (four) times a day as needed for cough 180 mL 0     No current facility-administered medications on file prior to visit.         Objective   /68 (BP Location: Left arm, Patient Position: Sitting, Cuff Size: Adult)   Pulse 60   Temp 98.2 °F (36.8 °C) (Temporal)   Resp 18   Ht 5' 4\" (1.626 m)   Wt 80.8 kg (178 lb 3.2 oz)   SpO2 96%   BMI 30.59 kg/m²      Physical Exam  Vitals and nursing note reviewed.   Constitutional:       General: She is not in acute distress.     Appearance: She is well-developed.   HENT:      Head: Normocephalic and atraumatic.   Neck:      Thyroid: No thyromegaly or thyroid tenderness.   Cardiovascular:      Rate and Rhythm: Normal rate and regular rhythm.   Pulmonary:      Effort: Pulmonary effort is normal. No respiratory distress.   Musculoskeletal:         General: No swelling.      Cervical back: Neck supple.   Skin:     General: Skin is warm and dry.   Neurological:      Mental Status: She is alert and oriented to person, place, and time.      Deep Tendon Reflexes: Reflexes normal.         Administrative Statements   I have spent a total time of 35 minutes in caring for this patient on the day of the visit/encounter including Diagnostic results, Prognosis, Risks and benefits of tx options, Instructions for management, Patient and family education, Importance of tx compliance, Risk factor reductions, Impressions, Counseling / Coordination of care, Documenting in the medical record, Reviewing / ordering tests, medicine, procedures  , and Obtaining or reviewing history  .     Component      Latest Ref Rng 1/1/2025   TSH 3RD GENERATON      0.450 - 4.500 uIU/mL 5.161 (H)    FREE T4      0.61 - 1.12 ng/dL 0.69       Legend:  (H) High  "

## 2025-02-06 ENCOUNTER — APPOINTMENT (OUTPATIENT)
Dept: LAB | Facility: HOSPITAL | Age: 55
End: 2025-02-06
Payer: COMMERCIAL

## 2025-02-06 ENCOUNTER — RESULTS FOLLOW-UP (OUTPATIENT)
Dept: ENDOCRINOLOGY | Facility: CLINIC | Age: 55
End: 2025-02-06

## 2025-02-06 LAB
T3 SERPL-MCNC: 0.9 NG/ML (ref 0.9–1.8)
TSH SERPL DL<=0.05 MIU/L-ACNC: 1.72 UIU/ML (ref 0.45–4.5)

## 2025-02-06 PROCEDURE — 83520 IMMUNOASSAY QUANT NOS NONAB: CPT

## 2025-02-06 PROCEDURE — 36415 COLL VENOUS BLD VENIPUNCTURE: CPT

## 2025-02-06 NOTE — ASSESSMENT & PLAN NOTE
Long history of hyperthyroidism due to Graves' disease currently on antithyroid medication with methimazole 10 mg daily and recent TFT which showed iatrogenic hyperthyroidism with elevated TSH, therefore methimazole should be adjusted, although we agreed to repeat TSH, total T3 along with TRAb antibody to get an updated result to adjust the dose accordingly.  If TRAb antibody is negative, the chance that Graves' is in remission is likely.  In this case we will try to taper her off antithyroid medication.  We did reviewed definitive therapy for hyperthyroidism including iodine ablation for total thyroidectomy in case that hyperthyroidism is not in remission given the fact that she has been on methimazole for a long period of time however she is not willing to consider them.  Methimazole adverse reactions reviewed.  Thyroid-pituitary axis discussed.  Physical exam is negative for thyroid nodule, I deferred by ultrasound.    Orders:    TSH, 3rd generation; Future    T3; Future    TRAb (TSH Receptor Binding Antibody); Future

## 2025-02-09 LAB — TSH RECEP AB SER-ACNC: <1.1 IU/L (ref 0–1.75)

## 2025-02-11 DIAGNOSIS — E05.90 HYPERTHYROIDISM: Primary | ICD-10-CM

## 2025-02-11 RX ORDER — METHIMAZOLE 5 MG/1
TABLET ORAL
Qty: 90 TABLET | Refills: 1 | Status: SHIPPED | OUTPATIENT
Start: 2025-02-11

## 2025-04-18 ENCOUNTER — OFFICE VISIT (OUTPATIENT)
Dept: OBGYN CLINIC | Facility: CLINIC | Age: 55
End: 2025-04-18
Payer: COMMERCIAL

## 2025-04-18 ENCOUNTER — APPOINTMENT (OUTPATIENT)
Dept: RADIOLOGY | Facility: CLINIC | Age: 55
End: 2025-04-18
Attending: ORTHOPAEDIC SURGERY
Payer: COMMERCIAL

## 2025-04-18 VITALS — HEIGHT: 64 IN | WEIGHT: 183.4 LBS | BODY MASS INDEX: 31.31 KG/M2

## 2025-04-18 DIAGNOSIS — M25.562 LEFT KNEE PAIN, UNSPECIFIED CHRONICITY: Primary | ICD-10-CM

## 2025-04-18 DIAGNOSIS — M25.562 LEFT KNEE PAIN, UNSPECIFIED CHRONICITY: ICD-10-CM

## 2025-04-18 PROCEDURE — 99203 OFFICE O/P NEW LOW 30 MIN: CPT | Performed by: ORTHOPAEDIC SURGERY

## 2025-04-18 PROCEDURE — 20610 DRAIN/INJ JOINT/BURSA W/O US: CPT | Performed by: ORTHOPAEDIC SURGERY

## 2025-04-18 PROCEDURE — 73564 X-RAY EXAM KNEE 4 OR MORE: CPT

## 2025-04-18 RX ORDER — METHYLPREDNISOLONE ACETATE 40 MG/ML
2 INJECTION, SUSPENSION INTRA-ARTICULAR; INTRALESIONAL; INTRAMUSCULAR; SOFT TISSUE
Status: COMPLETED | OUTPATIENT
Start: 2025-04-18 | End: 2025-04-18

## 2025-04-18 RX ORDER — LIDOCAINE HYDROCHLORIDE 10 MG/ML
2 INJECTION, SOLUTION INFILTRATION; PERINEURAL
Status: COMPLETED | OUTPATIENT
Start: 2025-04-18 | End: 2025-04-18

## 2025-04-18 RX ORDER — BUPIVACAINE HYDROCHLORIDE 2.5 MG/ML
2 INJECTION, SOLUTION INFILTRATION; PERINEURAL
Status: COMPLETED | OUTPATIENT
Start: 2025-04-18 | End: 2025-04-18

## 2025-04-18 RX ORDER — SEMAGLUTIDE 1.7 MG/.75ML
INJECTION, SOLUTION SUBCUTANEOUS
COMMUNITY
Start: 2025-04-05

## 2025-04-18 RX ADMIN — BUPIVACAINE HYDROCHLORIDE 2 ML: 2.5 INJECTION, SOLUTION INFILTRATION; PERINEURAL at 14:00

## 2025-04-18 RX ADMIN — METHYLPREDNISOLONE ACETATE 2 ML: 40 INJECTION, SUSPENSION INTRA-ARTICULAR; INTRALESIONAL; INTRAMUSCULAR; SOFT TISSUE at 14:00

## 2025-04-18 RX ADMIN — LIDOCAINE HYDROCHLORIDE 2 ML: 10 INJECTION, SOLUTION INFILTRATION; PERINEURAL at 14:00

## 2025-04-18 NOTE — PROGRESS NOTES
Name: Patrizia Abraham      : 1970      MRN: [id number]  Encounter Provider: Darius Mora DO  Encounter Date: 2025   Encounter department: [facility] [place]  :  Assessment & Plan  Left knee pain, unspecified chronicity    Orders:  •  XR knee 4+ vw left injury; Future  •  Large joint arthrocentesis: L knee    Left knee osteoarthritis with recently worsening pain.  X-rays were reviewed in office today with patient  Nonoperative treatment options discussed including oral analgesics, activity modification, injection therapy of CSI, VISCO or PRP, and bracing are forms of therapy.  Surgical intervention would include total knee arthroplasty.  I do not recommend surgical intervention at this time.  Patient elected to proceed forward corticosteroid injection.  Risks and benefits were discussed and the patient tolerated procedure well.    Formal physical therapy was declined today.    Follow up on as needed.     Chief Complaint     Left knee pain    History of the Present Illness   History of Present Illness   HPI   Patrizia Abraham is a 54 y.o. female with Left knee.  She has a surgical history of left knee arthroscopy partial lateral menisectomy and excision of anterior cyst on 2022 by Dr. Sherman, and S/P Left knee arthroscopy, partial lateral meniscus repair and removal of ACL cyst which was sent for biopsy from 10/10/2019 with Dr. Salmon. Patient states the first surgery was indicated due to a fall down a set of stairs and the second surgery was due to on going discomfort. She treated with a couple weeks of PT after surgery and denies following up with Dr. Sherman.  She reports that her pain has been worsening over the past 3 months without specific injury.  Pain is located primarily over the lateral aspect of her knee.  She denies any locking.  She notes intermittent swelling.    Review of Systems     Review of Systems   Constitutional:  Negative for chills and fever.  "  HENT:  Negative for ear pain and sore throat.    Eyes:  Negative for pain and visual disturbance.   Respiratory:  Negative for cough and shortness of breath.    Cardiovascular:  Negative for chest pain and palpitations.   Gastrointestinal:  Negative for abdominal pain and vomiting.   Genitourinary:  Negative for dysuria and hematuria.   Musculoskeletal:  Positive for arthralgias. Negative for back pain.   Skin:  Negative for color change and rash.   Neurological:  Negative for seizures and syncope.   All other systems reviewed and are negative.      Physical Exam   Objective   Ht 5' 4\" (1.626 m)   Wt 83.2 kg (183 lb 6.4 oz)   BMI 31.48 kg/m²      Left  Knee  Range of motion from 0 to 130.    There is is crepitus with range of motion.   There is trace effusion.    There is  tenderness over the lateral greater than medial joint line.    There is 4+/5 quadriceps strength     The patient is able to perform a straight leg raise.    Varus stress testing reveals stable at 0 and 30 degrees   Valgus stress testing reveals stable at 0 and 30 degrees  The patient is neurovascular intact distally.      Eyes:  Anicteric sclerae.  Neck:  Supple.  Lungs:  Normal respiratory effort.  Cardiovascular:  Capillary refill is less than 2 seconds.  Skin:  Intact without erythema.  Neurologic:  Sensation grossly intact to light touch.  Psychiatric:  Mood and affect are appropriate.    Data Review     I have personally reviewed pertinent films in PACS, and my interpretation follows:    X-rays taken 4/18/2025 of Left knee independently reviewed and demonstrate tricompartmental degenerative changes with moderate lateral joint space narrowing and mild patellofemoral medial compartment degenerative changes. No evidence of fracture or dislocation.     Fasting glucose from 1/1/2025 was 100.    No results found for: \"HGBA1C\", \"ESR\", \"CRP\", \"LYMEIGG\", \"SYNCRYSTAL\"    History reviewed. No pertinent past medical history.    Past Surgical History: "   Procedure Laterality Date   • GASTRIC BYPASS     • HERNIA REPAIR         Allergies   Allergen Reactions   • Pollen Extract Allergic Rhinitis       Current Outpatient Medications on File Prior to Visit   Medication Sig Dispense Refill   • LORazepam (ATIVAN) 0.5 mg tablet Take 1 tablet by mouth daily as needed     • methimazole (TAPAZOLE) 5 mg tablet 5 mg daily 90 tablet 1   • omeprazole (PriLOSEC) 40 MG capsule Take 1 capsule by mouth daily     • Wegovy 1.7 MG/0.75ML INJECT 1.7 MG EVERY WEEK BY SUBCUTANEOUS ROUTE.     • Wegovy 1 MG/0.5ML INJECT 1 MG EVERY WEEK BY SUBCUTANEOUS ROUTE. (Patient not taking: Reported on 4/18/2025)     • [DISCONTINUED] benzonatate (TESSALON) 200 MG capsule Take 1 capsule (200 mg total) by mouth 3 (three) times a day as needed for cough 20 capsule 0   • [DISCONTINUED] lidocaine (Lidoderm) 5 % Apply 1 patch topically over 12 hours daily Remove & Discard patch within 12 hours or as directed by MD 30 patch 0   • [DISCONTINUED] methocarbamol (ROBAXIN) 500 mg tablet Take 1 tablet (500 mg total) by mouth 3 (three) times a day 9 tablet 0   • [DISCONTINUED] Multiple Vitamins-Minerals (Multi For Her) TABS Take 1 tablet by mouth daily     • [DISCONTINUED] promethazine-dextromethorphan (PHENERGAN-DM) 6.25-15 mg/5 mL oral syrup Take 5 mL by mouth 4 (four) times a day as needed for cough 180 mL 0     No current facility-administered medications on file prior to visit.       Social History     Tobacco Use   • Smoking status: Never   • Smokeless tobacco: Never   Vaping Use   • Vaping status: Never Used   Substance Use Topics   • Alcohol use: Not Currently   • Drug use: Not Currently       Family History   Problem Relation Age of Onset   • Diabetes type II Mother    • Hypertension Mother    • Hyperthyroidism Mother        Procedures Performed     Large joint arthrocentesis: L knee  Universal Protocol:  Consent: Verbal consent obtained.  Risks and benefits: risks, benefits and alternatives were  "discussed  Consent given by: patient  Time out: Immediately prior to procedure a \"time out\" was called to verify the correct patient, procedure, equipment, support staff and site/side marked as required.  Patient understanding: patient states understanding of the procedure being performed  Site marked: the operative site was marked  Patient identity confirmed: verbally with patient  Supporting Documentation  Indications: pain   Procedure Details  Location: knee - L knee  Preparation: Patient was prepped and draped in the usual sterile fashion  Needle size: 22 G  Ultrasound guidance: no  Approach: anterolateral  Medications administered: 2 mL bupivacaine 0.25 %; 2 mL lidocaine 1 %; 2 mL methylPREDNISolone acetate 40 mg/mL    Patient tolerance: patient tolerated the procedure well with no immediate complications  Dressing:  Sterile dressing applied          Scribe Attestation    I,:   am acting as a scribe while in the presence of the attending physician.:       I,:   personally performed the services described in this documentation    as scribed in my presence.:          "

## 2025-06-08 ENCOUNTER — OFFICE VISIT (OUTPATIENT)
Dept: URGENT CARE | Facility: CLINIC | Age: 55
End: 2025-06-08
Payer: COMMERCIAL

## 2025-06-08 VITALS
RESPIRATION RATE: 18 BRPM | WEIGHT: 180 LBS | OXYGEN SATURATION: 99 % | DIASTOLIC BLOOD PRESSURE: 86 MMHG | BODY MASS INDEX: 30.9 KG/M2 | SYSTOLIC BLOOD PRESSURE: 134 MMHG | HEART RATE: 57 BPM | TEMPERATURE: 98 F

## 2025-06-08 DIAGNOSIS — J01.00 ACUTE NON-RECURRENT MAXILLARY SINUSITIS: Primary | ICD-10-CM

## 2025-06-08 DIAGNOSIS — Z86.19 HISTORY OF CANDIDAL VULVOVAGINITIS: ICD-10-CM

## 2025-06-08 PROCEDURE — 99214 OFFICE O/P EST MOD 30 MIN: CPT | Performed by: PHYSICIAN ASSISTANT

## 2025-06-08 RX ORDER — AZITHROMYCIN 250 MG/1
TABLET, FILM COATED ORAL
Qty: 6 TABLET | Refills: 0 | Status: SHIPPED | OUTPATIENT
Start: 2025-06-08 | End: 2025-06-12

## 2025-06-08 RX ORDER — FLUCONAZOLE 150 MG/1
150 TABLET ORAL ONCE
Qty: 1 TABLET | Refills: 0 | Status: SHIPPED | OUTPATIENT
Start: 2025-06-08 | End: 2025-06-08

## 2025-06-08 NOTE — PROGRESS NOTES
Patient Name: Patrizia Abraham      : 1970      MRN: [id number]  Encounter Provider: Bárbara Causey PA-C  Encounter Date: 2025  Encounter department: [facility]    Assessment & Plan  Acute non-recurrent maxillary sinusitis    Orders:    azithromycin (ZITHROMAX) 250 mg tablet; Take 2 tablets today then 1 tablet daily x 4 days  - Rx antibiotics due to duration and worsening of symptoms   History of candidal vulvovaginitis    Orders:    fluconazole (DIFLUCAN) 150 mg tablet; Take 1 tablet (150 mg total) by mouth once for 1 dose  - Patient has history of yeast infection with antibiotic use     Patient Instructions:   There are no Patient Instructions on file for this visit.    Subjective   Chief Complaint   Patient presents with    Cold Like Symptoms     Pt here for head congestion ear pain dry cough that started 5 days ago and has taken mucinex to help. Pt did a covid test that was negative         Patrizia Abraham is a 54 y.o.female  Patient presents with nasal congestion, sinus congestion, dry cough, ears clogged, sore throat for 5 days.  She has been taking Mucinex with minimal relief.  She states her symptoms are getting worse.        Review of Systems   Constitutional:  Negative for chills, fatigue and fever.   HENT:  Positive for congestion, ear pain, sinus pressure, sinus pain and sore throat. Negative for postnasal drip and rhinorrhea.    Eyes:  Negative for pain and visual disturbance.   Respiratory:  Positive for cough. Negative for chest tightness and shortness of breath.    Cardiovascular:  Negative for chest pain and palpitations.   Gastrointestinal:  Negative for abdominal pain, diarrhea, nausea and vomiting.   Genitourinary:  Negative for dysuria and hematuria.   Musculoskeletal:  Negative for arthralgias, back pain and myalgias.   Skin:  Negative for rash.   Neurological:  Negative for dizziness, seizures, syncope, numbness and headaches.   All other systems  reviewed and are negative.      Current Medications[1]  Allergies as of 06/08/2025 - Reviewed 06/08/2025   Allergen Reaction Noted    Pollen extract Allergic Rhinitis 03/21/2020     Past Medical History[2]  Past Surgical History[3]  Family History[4]     Objective   /86   Pulse 57   Temp 98 °F (36.7 °C) (Tympanic)   Resp 18   Wt 81.6 kg (180 lb)   SpO2 99%   BMI 30.90 kg/m²      Physical Exam  Vitals and nursing note reviewed.   Constitutional:       Appearance: Normal appearance. She is normal weight.   HENT:      Head: Normocephalic and atraumatic.      Right Ear: Tympanic membrane, ear canal and external ear normal.      Left Ear: Tympanic membrane, ear canal and external ear normal.      Nose:      Right Sinus: Maxillary sinus tenderness present.      Left Sinus: Maxillary sinus tenderness present.      Mouth/Throat:      Mouth: Mucous membranes are moist.      Pharynx: Oropharynx is clear.     Cardiovascular:      Rate and Rhythm: Normal rate and regular rhythm.      Heart sounds: Normal heart sounds.   Pulmonary:      Effort: Pulmonary effort is normal.      Breath sounds: Normal breath sounds.     Skin:     General: Skin is warm and dry.     Neurological:      General: No focal deficit present.      Mental Status: She is alert and oriented to person, place, and time.     Psychiatric:         Mood and Affect: Mood normal.         Behavior: Behavior normal.            [1]   Current Outpatient Medications:     azithromycin (ZITHROMAX) 250 mg tablet, Take 2 tablets today then 1 tablet daily x 4 days, Disp: 6 tablet, Rfl: 0    fluconazole (DIFLUCAN) 150 mg tablet, Take 1 tablet (150 mg total) by mouth once for 1 dose, Disp: 1 tablet, Rfl: 0    LORazepam (ATIVAN) 0.5 mg tablet, Take 1 tablet by mouth daily as needed, Disp: , Rfl:     methimazole (TAPAZOLE) 5 mg tablet, 5 mg daily, Disp: 90 tablet, Rfl: 1    omeprazole (PriLOSEC) 40 MG capsule, Take 1 capsule by mouth in the morning., Disp: , Rfl:      Wegovy 1.7 MG/0.75ML, , Disp: , Rfl:     Wegovy 1 MG/0.5ML, INJECT 1 MG EVERY WEEK BY SUBCUTANEOUS ROUTE. (Patient not taking: Reported on 4/18/2025), Disp: , Rfl:   [2] No past medical history on file.  [3]   Past Surgical History:  Procedure Laterality Date    GASTRIC BYPASS      HERNIA REPAIR     [4]   Family History  Problem Relation Name Age of Onset    Diabetes type II Mother Yee sister     Hypertension Mother Yee sister     Hyperthyroidism Mother Yee sister

## 2025-07-26 ENCOUNTER — APPOINTMENT (OUTPATIENT)
Dept: LAB | Facility: HOSPITAL | Age: 55
End: 2025-07-26
Payer: COMMERCIAL

## 2025-07-26 DIAGNOSIS — E05.90 HYPERTHYROIDISM: ICD-10-CM

## 2025-07-26 LAB — TSH SERPL DL<=0.05 MIU/L-ACNC: 1.5 UIU/ML (ref 0.45–4.5)

## 2025-07-26 PROCEDURE — 36415 COLL VENOUS BLD VENIPUNCTURE: CPT

## 2025-07-26 PROCEDURE — 84443 ASSAY THYROID STIM HORMONE: CPT

## 2025-07-26 PROCEDURE — 84439 ASSAY OF FREE THYROXINE: CPT

## 2025-07-27 LAB — T4 FREE SERPL-MCNC: 0.69 NG/DL (ref 0.61–1.12)

## 2025-08-06 ENCOUNTER — OFFICE VISIT (OUTPATIENT)
Dept: ENDOCRINOLOGY | Facility: CLINIC | Age: 55
End: 2025-08-06
Payer: COMMERCIAL

## 2025-08-06 VITALS
TEMPERATURE: 98.2 F | HEART RATE: 58 BPM | DIASTOLIC BLOOD PRESSURE: 70 MMHG | HEIGHT: 64 IN | RESPIRATION RATE: 18 BRPM | WEIGHT: 191.6 LBS | SYSTOLIC BLOOD PRESSURE: 130 MMHG | OXYGEN SATURATION: 98 % | BODY MASS INDEX: 32.71 KG/M2

## 2025-08-06 DIAGNOSIS — E66.811 CLASS 1 OBESITY DUE TO EXCESS CALORIES WITH SERIOUS COMORBIDITY AND BODY MASS INDEX (BMI) OF 32.0 TO 32.9 IN ADULT: ICD-10-CM

## 2025-08-06 DIAGNOSIS — E66.09 CLASS 1 OBESITY DUE TO EXCESS CALORIES WITH SERIOUS COMORBIDITY AND BODY MASS INDEX (BMI) OF 32.0 TO 32.9 IN ADULT: ICD-10-CM

## 2025-08-06 DIAGNOSIS — G47.33 OSA (OBSTRUCTIVE SLEEP APNEA): ICD-10-CM

## 2025-08-06 DIAGNOSIS — E05.90 HYPERTHYROIDISM: Primary | ICD-10-CM

## 2025-08-06 PROCEDURE — 99214 OFFICE O/P EST MOD 30 MIN: CPT | Performed by: STUDENT IN AN ORGANIZED HEALTH CARE EDUCATION/TRAINING PROGRAM

## 2025-08-06 RX ORDER — SEMAGLUTIDE 1 MG/.5ML
1 INJECTION, SOLUTION SUBCUTANEOUS WEEKLY
Qty: 2 ML | Refills: 1 | Status: SHIPPED | OUTPATIENT
Start: 2025-08-06

## 2025-08-06 RX ORDER — METHIMAZOLE 5 MG/1
2.5 TABLET ORAL DAILY
Qty: 45 TABLET | Refills: 1 | Status: SHIPPED | OUTPATIENT
Start: 2025-08-06

## 2025-08-06 RX ORDER — METHIMAZOLE 5 MG/1
2.5 TABLET ORAL DAILY
Qty: 45 TABLET | Refills: 1 | Status: SHIPPED | OUTPATIENT
Start: 2025-08-06 | End: 2025-08-06 | Stop reason: SDUPTHER

## 2025-08-06 RX ORDER — SEMAGLUTIDE 0.25 MG/.5ML
INJECTION, SOLUTION SUBCUTANEOUS WEEKLY
COMMUNITY
Start: 2025-05-02 | End: 2025-08-06